# Patient Record
Sex: FEMALE | Race: WHITE | NOT HISPANIC OR LATINO | Employment: FULL TIME | URBAN - METROPOLITAN AREA
[De-identification: names, ages, dates, MRNs, and addresses within clinical notes are randomized per-mention and may not be internally consistent; named-entity substitution may affect disease eponyms.]

---

## 2017-01-12 ENCOUNTER — HOSPITAL ENCOUNTER (OUTPATIENT)
Dept: LAB | Facility: MEDICAL CENTER | Age: 37
End: 2017-01-12
Attending: PHYSICIAN ASSISTANT
Payer: COMMERCIAL

## 2017-01-12 LAB
25(OH)D3 SERPL-MCNC: 12 NG/ML (ref 30–100)
ALBUMIN SERPL BCP-MCNC: 4.3 G/DL (ref 3.2–4.9)
ALBUMIN/GLOB SERPL: 1.5 G/DL
ALP SERPL-CCNC: 45 U/L (ref 30–99)
ALT SERPL-CCNC: 18 U/L (ref 2–50)
ANION GAP SERPL CALC-SCNC: 7 MMOL/L (ref 0–11.9)
AST SERPL-CCNC: 18 U/L (ref 12–45)
BILIRUB SERPL-MCNC: 0.9 MG/DL (ref 0.1–1.5)
BUN SERPL-MCNC: 9 MG/DL (ref 8–22)
CALCIUM SERPL-MCNC: 9.4 MG/DL (ref 8.5–10.5)
CHLORIDE SERPL-SCNC: 107 MMOL/L (ref 96–112)
CHOLEST SERPL-MCNC: 157 MG/DL (ref 100–199)
CO2 SERPL-SCNC: 23 MMOL/L (ref 20–33)
CREAT SERPL-MCNC: 0.79 MG/DL (ref 0.5–1.4)
FSH SERPL-ACNC: 2.2 MIU/ML
GLOBULIN SER CALC-MCNC: 2.8 G/DL (ref 1.9–3.5)
GLUCOSE SERPL-MCNC: 82 MG/DL (ref 65–99)
HDLC SERPL-MCNC: 61 MG/DL
LDLC SERPL CALC-MCNC: 79 MG/DL
LH SERPL-ACNC: 1 IU/L
POTASSIUM SERPL-SCNC: 4.2 MMOL/L (ref 3.6–5.5)
PROT SERPL-MCNC: 7.1 G/DL (ref 6–8.2)
SODIUM SERPL-SCNC: 137 MMOL/L (ref 135–145)
TRIGL SERPL-MCNC: 83 MG/DL (ref 0–149)
TSH SERPL DL<=0.005 MIU/L-ACNC: 1.03 UIU/ML (ref 0.3–3.7)

## 2017-01-12 PROCEDURE — 80061 LIPID PANEL: CPT

## 2017-01-12 PROCEDURE — 82306 VITAMIN D 25 HYDROXY: CPT

## 2017-01-12 PROCEDURE — 36415 COLL VENOUS BLD VENIPUNCTURE: CPT

## 2017-01-12 PROCEDURE — 80053 COMPREHEN METABOLIC PANEL: CPT

## 2017-01-12 PROCEDURE — 83002 ASSAY OF GONADOTROPIN (LH): CPT

## 2017-01-12 PROCEDURE — 84443 ASSAY THYROID STIM HORMONE: CPT

## 2017-01-12 PROCEDURE — 83001 ASSAY OF GONADOTROPIN (FSH): CPT

## 2017-06-30 ENCOUNTER — OFFICE VISIT (OUTPATIENT)
Dept: URGENT CARE | Facility: PHYSICIAN GROUP | Age: 37
End: 2017-06-30
Payer: COMMERCIAL

## 2017-06-30 VITALS
TEMPERATURE: 98.8 F | RESPIRATION RATE: 16 BRPM | BODY MASS INDEX: 28.03 KG/M2 | OXYGEN SATURATION: 96 % | DIASTOLIC BLOOD PRESSURE: 70 MMHG | HEART RATE: 90 BPM | SYSTOLIC BLOOD PRESSURE: 108 MMHG | WEIGHT: 179 LBS

## 2017-06-30 DIAGNOSIS — J01.90 ACUTE BACTERIAL SINUSITIS: ICD-10-CM

## 2017-06-30 DIAGNOSIS — B96.89 ACUTE BACTERIAL SINUSITIS: ICD-10-CM

## 2017-06-30 PROCEDURE — 99214 OFFICE O/P EST MOD 30 MIN: CPT | Performed by: FAMILY MEDICINE

## 2017-06-30 RX ORDER — PREDNISONE 20 MG/1
TABLET ORAL
Qty: 5 TAB | Refills: 0 | Status: SHIPPED | OUTPATIENT
Start: 2017-06-30

## 2017-06-30 RX ORDER — AZITHROMYCIN 250 MG/1
TABLET, FILM COATED ORAL
Qty: 6 TAB | Refills: 0 | Status: SHIPPED | OUTPATIENT
Start: 2017-06-30

## 2017-06-30 NOTE — MR AVS SNAPSHOT
Larisa Castro Major   2017 4:45 PM   Office Visit   MRN: [id number]    Department:  [facility]   Dept Phone:  [phone]    Description:  Female : 1980   Provider:  Chetan Arechiga M.D.           Reason for Visit     Otalgia L ear pain bilat      Allergies as of 2017     Allergen Noted Reactions    Cipro Xr 2011   Rash    Ear drops    Keflex 2013   Rash    Other Misc 2012       nuts      You were diagnosed with     Acute bacterial sinusitis   [[id number]]         Vital Signs     Blood Pressure Pulse Temperature Respirations Weight Oxygen Saturation    108/70 mmHg 90 37.1 °C (98.8 °F) 16 81.194 kg (179 lb) 96%    Last Menstrual Period Smoking Status                2011 Never Smoker           Basic Information     Date Of Birth Sex Race Ethnicity Preferred Language    1980 Female White Non- English      Health Maintenance        Date Due Completion Dates    IMM DTaP/Tdap/Td Vaccine (1 - Tdap) 1999 ---    PAP SMEAR 2001 ---            Current Immunizations     No immunizations on file.      Below and/or attached are the medications your provider expects you to take. Review all of your home medications and newly ordered medications with your provider and/or pharmacist. Follow medication instructions as directed by your provider and/or pharmacist. Please keep your medication list with you and share with your provider. Update the information when medications are discontinued, doses are changed, or new medications (including over-the-counter products) are added; and carry medication information at all times in the event of emergency situations     Allergies:  CIPRO XR - Rash     KEFLEX - Rash     OTHER MISC - (reactions not documented)               Medications  Valid as of: 2017 -  5:09 PM    Generic Name Brand Name Tablet Size Instructions for use    Azithromycin (Tab) ZITHROMAX 250 MG 2 TABS ON DAY 1, 1 TAB ON DAYS 2-5.        PredniSONE (Tab) DELTASONE 20 MG 1 TAB ONCE A DAY ONLY IF NEEDED FOR STUFFY NOSE, STUFFY HEAD, AND/OR STUFFY EARS. TAKE WITH FOOD.        .                 Medicines prescribed today were sent to:     [facility] [address] [place] [address] [place], [place] - [address] [facility] [address]    [address] [place] [address]    Phone: [phone] Fax: [phone]    Open 24 Hours?: No      Medication refill instructions:       If your prescription bottle indicates you have medication refills left, it is not necessary to call your provider’s office. Please contact your pharmacy and they will refill your medication.    If your prescription bottle indicates you do not have any refills left, you may request refills at any time through one of the following ways: The online [employer] system (except Urgent Care), by calling your provider’s office, or by asking your pharmacy to contact your provider’s office with a refill request. Medication refills are processed only during regular business hours and may not be available until the next business day. Your provider may request additional information or to have a follow-up visit with you prior to refilling your medication.   *Please Note: Medication refills are assigned a new Rx number when refilled electronically. Your pharmacy may indicate that no refills were authorized even though a new prescription for the same medication is available at the pharmacy. Please request the medicine by name with the pharmacy before contacting your provider for a refill.           [employer] Access Code: Activation code not generated  Current [employer] Status: Active

## 2017-07-01 NOTE — PROGRESS NOTES
Chief Complaint:    Chief Complaint   Patient presents with   • Otalgia     L ear pain bilat       History of Present Illness:    This is a new problem. For about 3 weeks, she has had fluctuating nasal symptoms with occasional purulent mucus from nose. Has been having intermittent low grade fever in 99 F range. Has had headaches, fatigue, and body aches. Past few days, ears are bothering her, currently in clinic only the left ear is bothersome. Feels like a lot of pressure in left ear. No sore throat or cough. Z-windy works/tolerates.      Review of Systems:    Constitutional: See HPI.  Eyes: Negative for change in vision, photophobia, pain, redness, and discharge.  ENT: See HPI.   Respiratory: Negative for cough, hemoptysis, sputum production, shortness of breath, wheezing, and stridor.    Cardiovascular: Negative for chest pain, palpitations, orthopnea, claudication, leg swelling, and PND.   Gastrointestinal: Negative for abdominal pain, nausea, vomiting, diarrhea, constipation, blood in stool, and melena.   Genitourinary: Negative for dysuria, urinary urgency, urinary frequency, hematuria, and flank pain.   Musculoskeletal: See HPI.  Skin: Negative for rash and itching.   Neurological: See HPI.  Endo: Negative for polydipsia.   Heme: Does not bruise/bleed easily.   Psychiatric/Behavioral: Negative for depression, suicidal ideas, hallucinations, memory loss and substance abuse. The patient is not nervous/anxious and does not have insomnia.      Past Medical History:    Past Medical History   Diagnosis Date   • Cold    • Other specified symptom associated with female genital organs      endometriosis, polyps, bleeding   • Renal disorder      some renal failure, resolved, freq infections   • PCOS (polycystic ovarian syndrome)        Past Surgical History:    Past Surgical History   Procedure Laterality Date   • Hysterectomy, vaginal  1/6/12   • Cystoscopy  1/6/12   • Vaginal hysterectomy total  1/6/2012      Performed by JERMAINE LOVING at SURGERY SAME DAY [facility]   • Cystoscopy  1/6/2012     Performed by JERMAINE LOVING at SURGERY SAME DAY [facility] OR[facility]       Social History:    Social History     Social History   • Marital Status:      Spouse Name: N/A   • Number of Children: N/A   • Years of Education: N/A     Occupational History   • Not on file.     Social History Main Topics   • Smoking status: Never Smoker    • Smokeless tobacco: Never Used   • Alcohol Use: No   • Drug Use: No   • Sexual Activity: Not on file     Other Topics Concern   • Not on file     Social History Narrative       Family History:    History reviewed. No pertinent family history.    Medications:    No current outpatient prescriptions on file prior to visit.     No current facility-administered medications on file prior to visit.       Allergies:    Allergies   Allergen Reactions   • Cipro Xr Rash     Ear drops   • Keflex Rash   • Other Misc      nuts         Vitals:    Filed Vitals:    06/30/17 1646   BP: 108/70   Pulse: 90   Temp: 37.1 °C (98.8 °F)   Resp: 16   Weight: 81.194 kg (179 lb)   SpO2: 96%       Physical Exam:    Constitutional: Vital signs reviewed. Appears well-developed and well-nourished. No acute distress.   Eyes: Sclera white, conjunctivae clear.   ENT: External ears normal. External auditory canals normal without discharge. TMs translucent and non-bulging. Hearing normal. Nasal mucosa erythematous bilaterally. Lips/teeth are normal. Oral mucosa pink and moist. Posterior pharynx: WNL.  Neck: Neck supple.   Pulmonary/Chest: Respirations non-labored.   Lymph: Cervical nodes without tenderness or enlargement.  Musculoskeletal: Normal gait. Normal range of motion. No muscular atrophy or weakness.  Neurological: Alert and oriented to person, place, and time. Muscle tone normal. Coordination normal.   Skin: No rashes or lesions. Warm, dry, normal turgor.  Psychiatric: Normal mood and affect. Behavior is normal. Judgment  and thought content normal.       Assessment / Plan:    1. Acute bacterial sinusitis  - azithromycin (ZITHROMAX) 250 MG Tab; 2 TABS ON DAY 1, 1 TAB ON DAYS 2-5.  Dispense: 6 Tab; Refill: 0  - predniSONE (DELTASONE) 20 MG Tab; 1 TAB ONCE A DAY ONLY IF NEEDED FOR STUFFY NOSE, STUFFY HEAD, AND/OR STUFFY EARS. TAKE WITH FOOD.  Dispense: 5 Tab; Refill: 0      Discussed with her DDX and management options.    Ears are normal on exam. Possible discomfort due to sinus issues.    Agreeable to medications prescribed.    Follow-up with PCP or urgent care if getting worse or not better with above.

## 2017-08-30 ENCOUNTER — APPOINTMENT (OUTPATIENT)
Dept: LAB | Facility: MEDICAL CENTER | Age: 37
End: 2017-08-30
Attending: NURSE PRACTITIONER
Payer: COMMERCIAL

## 2017-08-30 ENCOUNTER — HOSPITAL ENCOUNTER (OUTPATIENT)
Dept: LAB | Facility: MEDICAL CENTER | Age: 37
End: 2017-08-30
Attending: FAMILY MEDICINE
Payer: COMMERCIAL

## 2017-08-30 ENCOUNTER — HOSPITAL ENCOUNTER (OUTPATIENT)
Facility: MEDICAL CENTER | Age: 37
End: 2017-08-30
Attending: FAMILY MEDICINE
Payer: COMMERCIAL

## 2017-08-30 LAB
ALBUMIN SERPL BCP-MCNC: 4.1 G/DL (ref 3.2–4.9)
ALBUMIN/GLOB SERPL: 1.5 G/DL
ALP SERPL-CCNC: 56 U/L (ref 30–99)
ALT SERPL-CCNC: 16 U/L (ref 2–50)
ANION GAP SERPL CALC-SCNC: 6 MMOL/L (ref 0–11.9)
AST SERPL-CCNC: 19 U/L (ref 12–45)
BASOPHILS # BLD AUTO: 0.4 % (ref 0–1.8)
BASOPHILS # BLD: 0.02 K/UL (ref 0–0.12)
BILIRUB SERPL-MCNC: 0.7 MG/DL (ref 0.1–1.5)
BUN SERPL-MCNC: 10 MG/DL (ref 8–22)
CALCIUM SERPL-MCNC: 9.3 MG/DL (ref 8.5–10.5)
CHLORIDE SERPL-SCNC: 107 MMOL/L (ref 96–112)
CO2 SERPL-SCNC: 23 MMOL/L (ref 20–33)
CREAT SERPL-MCNC: 0.7 MG/DL (ref 0.5–1.4)
EOSINOPHIL # BLD AUTO: 0.07 K/UL (ref 0–0.51)
EOSINOPHIL NFR BLD: 1.4 % (ref 0–6.9)
ERYTHROCYTE [DISTWIDTH] IN BLOOD BY AUTOMATED COUNT: 40.5 FL (ref 35.9–50)
GFR SERPL CREATININE-BSD FRML MDRD: >60 ML/MIN/1.73 M 2
GLOBULIN SER CALC-MCNC: 2.7 G/DL (ref 1.9–3.5)
GLUCOSE SERPL-MCNC: 74 MG/DL (ref 65–99)
HCT VFR BLD AUTO: 39 % (ref 37–47)
HGB BLD-MCNC: 13.4 G/DL (ref 12–16)
IMM GRANULOCYTES # BLD AUTO: 0 K/UL (ref 0–0.11)
IMM GRANULOCYTES NFR BLD AUTO: 0 % (ref 0–0.9)
LYMPHOCYTES # BLD AUTO: 2.2 K/UL (ref 1–4.8)
LYMPHOCYTES NFR BLD: 43.1 % (ref 22–41)
MCH RBC QN AUTO: 32.2 PG (ref 27–33)
MCHC RBC AUTO-ENTMCNC: 34.4 G/DL (ref 33.6–35)
MCV RBC AUTO: 93.8 FL (ref 81.4–97.8)
MONOCYTES # BLD AUTO: 0.46 K/UL (ref 0–0.85)
MONOCYTES NFR BLD AUTO: 9 % (ref 0–13.4)
NEUTROPHILS # BLD AUTO: 2.36 K/UL (ref 2–7.15)
NEUTROPHILS NFR BLD: 46.1 % (ref 44–72)
NRBC # BLD AUTO: 0 K/UL
NRBC BLD AUTO-RTO: 0 /100 WBC
PLATELET # BLD AUTO: 225 K/UL (ref 164–446)
PMV BLD AUTO: 9.8 FL (ref 9–12.9)
POTASSIUM SERPL-SCNC: 4.1 MMOL/L (ref 3.6–5.5)
PROT SERPL-MCNC: 6.8 G/DL (ref 6–8.2)
RBC # BLD AUTO: 4.16 M/UL (ref 4.2–5.4)
SODIUM SERPL-SCNC: 136 MMOL/L (ref 135–145)
WBC # BLD AUTO: 5.1 K/UL (ref 4.8–10.8)

## 2017-08-30 PROCEDURE — 87046 STOOL CULTR AEROBIC BACT EA: CPT

## 2017-08-30 PROCEDURE — 87328 CRYPTOSPORIDIUM AG IA: CPT

## 2017-08-30 PROCEDURE — 85025 COMPLETE CBC W/AUTO DIFF WBC: CPT

## 2017-08-30 PROCEDURE — 87045 FECES CULTURE AEROBIC BACT: CPT

## 2017-08-30 PROCEDURE — 87493 C DIFF AMPLIFIED PROBE: CPT

## 2017-08-30 PROCEDURE — 87329 GIARDIA AG IA: CPT

## 2017-08-30 PROCEDURE — 84443 ASSAY THYROID STIM HORMONE: CPT

## 2017-08-30 PROCEDURE — 82306 VITAMIN D 25 HYDROXY: CPT

## 2017-08-30 PROCEDURE — 87899 AGENT NOS ASSAY W/OPTIC: CPT

## 2017-08-30 PROCEDURE — 80053 COMPREHEN METABOLIC PANEL: CPT

## 2017-08-30 PROCEDURE — 36415 COLL VENOUS BLD VENIPUNCTURE: CPT

## 2017-08-31 LAB
25(OH)D3 SERPL-MCNC: 45 NG/ML (ref 30–100)
C DIFF DNA SPEC QL NAA+PROBE: NEGATIVE
C DIFF TOX GENS STL QL NAA+PROBE: NEGATIVE
G LAMBLIA+C PARVUM AG STL QL RAPID: NORMAL
SIGNIFICANT IND 70042: NORMAL
SITE SITE: NORMAL
SOURCE SOURCE: NORMAL
TSH SERPL DL<=0.005 MIU/L-ACNC: 1.13 UIU/ML (ref 0.3–3.7)

## 2017-09-01 LAB
E COLI SXT1+2 STL IA: NORMAL
SIGNIFICANT IND 70042: NORMAL
SITE SITE: NORMAL
SOURCE SOURCE: NORMAL

## 2017-09-03 LAB
BACTERIA STL CULT: NORMAL
E COLI SXT1+2 STL IA: NORMAL
SIGNIFICANT IND 70042: NORMAL
SITE SITE: NORMAL
SOURCE SOURCE: NORMAL

## 2018-05-19 ENCOUNTER — OFFICE VISIT (OUTPATIENT)
Dept: URGENT CARE | Facility: PHYSICIAN GROUP | Age: 38
End: 2018-05-19
Payer: COMMERCIAL

## 2018-05-19 VITALS
SYSTOLIC BLOOD PRESSURE: 100 MMHG | RESPIRATION RATE: 20 BRPM | HEART RATE: 104 BPM | HEIGHT: 67 IN | TEMPERATURE: 99 F | BODY MASS INDEX: 28.25 KG/M2 | DIASTOLIC BLOOD PRESSURE: 70 MMHG | WEIGHT: 180 LBS | OXYGEN SATURATION: 97 %

## 2018-05-19 DIAGNOSIS — J02.9 SORE THROAT: ICD-10-CM

## 2018-05-19 LAB
INT CON NEG: NORMAL
INT CON POS: NORMAL
S PYO AG THROAT QL: NORMAL

## 2018-05-19 PROCEDURE — 87880 STREP A ASSAY W/OPTIC: CPT | Performed by: EMERGENCY MEDICINE

## 2018-05-19 PROCEDURE — 99213 OFFICE O/P EST LOW 20 MIN: CPT | Performed by: EMERGENCY MEDICINE

## 2018-05-19 ASSESSMENT — ENCOUNTER SYMPTOMS
NERVOUS/ANXIOUS: 0
COUGH: 0
SENSORY CHANGE: 0
VOMITING: 0
HEMOPTYSIS: 0
NAUSEA: 0
NECK PAIN: 0
FEVER: 0
BACK PAIN: 0
SORE THROAT: 1
PALPITATIONS: 0
EYE REDNESS: 0
ABDOMINAL PAIN: 0
STRIDOR: 0
SPEECH CHANGE: 0
CHILLS: 0
EYE DISCHARGE: 0

## 2018-05-19 NOTE — PROGRESS NOTES
Subjective:      Larisa Gonsalves is a 37 y.o. female who presents with Pharyngitis (4 days )            HPI  Pt with URI symptoms for the past 4 days with sore throat and pain in her ears No fever (mild) no NVD. Tried Dayquil / Nyquil and Claritin D with rapid heart rate.     Allergies   Allergen Reactions   • Cipro Xr Rash     Ear drops   • Keflex Rash   • Other Misc      nuts     Social History     Social History   • Marital status:      Spouse name: N/A   • Number of children: N/A   • Years of education: N/A     Occupational History   • Not on file.     Social History Main Topics   • Smoking status: Never Smoker   • Smokeless tobacco: Never Used   • Alcohol use No   • Drug use: No   • Sexual activity: Not on file     Other Topics Concern   • Not on file     Social History Narrative   • No narrative on file     Past Medical History:   Diagnosis Date   • Cold    • Other specified symptom associated with female genital organs     endometriosis, polyps, bleeding   • PCOS (polycystic ovarian syndrome)    • Renal disorder     some renal failure, resolved, freq infections     Current Outpatient Prescriptions on File Prior to Visit   Medication Sig Dispense Refill   • azithromycin (ZITHROMAX) 250 MG Tab 2 TABS ON DAY 1, 1 TAB ON DAYS 2-5. 6 Tab 0   • predniSONE (DELTASONE) 20 MG Tab 1 TAB ONCE A DAY ONLY IF NEEDED FOR STUFFY NOSE, STUFFY HEAD, AND/OR STUFFY EARS. TAKE WITH FOOD. 5 Tab 0     No current facility-administered medications on file prior to visit.    No family history on file.  Review of Systems   Constitutional: Negative for chills and fever.   HENT: Positive for congestion and sore throat. Negative for ear discharge and nosebleeds.    Eyes: Negative for discharge and redness.   Respiratory: Negative for cough, hemoptysis and stridor.    Cardiovascular: Negative for chest pain and palpitations.   Gastrointestinal: Negative for abdominal pain, nausea and vomiting.   Genitourinary: Negative.   "  Musculoskeletal: Negative for back pain and neck pain.   Skin: Negative for rash.   Neurological: Negative for sensory change and speech change.   Psychiatric/Behavioral: The patient is not nervous/anxious.           Objective:     /70   Pulse (!) 104   Temp 37.2 °C (99 °F)   Resp 20   Ht 1.702 m (5' 7.01\")   Wt 81.6 kg (180 lb)   LMP 12/18/2011   SpO2 97%   Breastfeeding? No   BMI 28.19 kg/m²      Physical Exam   Constitutional: She appears well-developed and well-nourished. No distress.   HENT:   Head: Normocephalic and atraumatic.   Right Ear: External ear normal.   Left Ear: External ear normal.   Mouth/Throat: No oropharyngeal exudate.   Eyes: Conjunctivae are normal. Right eye exhibits no discharge. Left eye exhibits no discharge.   Neck: Normal range of motion.   Cardiovascular: Normal rate and regular rhythm.    Pulmonary/Chest: Effort normal and breath sounds normal. No stridor. No respiratory distress.   Abdominal: She exhibits no distension. There is no tenderness.   Musculoskeletal: Normal range of motion.   Lymphadenopathy:     She has no cervical adenopathy.   Neurological: She is alert. Coordination normal.   Skin: Skin is warm. No rash noted. She is not diaphoretic.   Psychiatric: She has a normal mood and affect. Her behavior is normal.   Nursing note and vitals reviewed.            Strep neg  Assessment/Plan:     1. Sore throat    I am recommending the patient initiate/ continue hydration efforts including the use of a vaporizer/humidifier/ netti pot. I also recommend the pt, initiate Mucinex (if older than 4) Sudafed or Dayquil if not hypertensive. Try OTC remedies for sore throat,  declined xylocaine.. If the patient's condition exacerbates with worsening dysphagia, shortness of breath, uncontrolled fever, headache or chest pressure he/she will return immediately to the urgent care or go to  the emergency department for further evaluation.    Jorge L Friedman    - POCT Rapid Strep " A

## 2018-12-12 ENCOUNTER — HOSPITAL ENCOUNTER (OUTPATIENT)
Dept: LAB | Facility: MEDICAL CENTER | Age: 38
End: 2018-12-12
Attending: FAMILY MEDICINE
Payer: COMMERCIAL

## 2018-12-12 LAB
25(OH)D3 SERPL-MCNC: 27 NG/ML (ref 30–100)
ALBUMIN SERPL BCP-MCNC: 4.2 G/DL (ref 3.2–4.9)
ALBUMIN/GLOB SERPL: 1.6 G/DL
ALP SERPL-CCNC: 59 U/L (ref 30–99)
ALT SERPL-CCNC: 41 U/L (ref 2–50)
ANION GAP SERPL CALC-SCNC: 4 MMOL/L (ref 0–11.9)
AST SERPL-CCNC: 35 U/L (ref 12–45)
BASOPHILS # BLD AUTO: 0.6 % (ref 0–1.8)
BASOPHILS # BLD: 0.03 K/UL (ref 0–0.12)
BILIRUB SERPL-MCNC: 0.7 MG/DL (ref 0.1–1.5)
BUN SERPL-MCNC: 10 MG/DL (ref 8–22)
CALCIUM SERPL-MCNC: 10.3 MG/DL (ref 8.5–10.5)
CHLORIDE SERPL-SCNC: 106 MMOL/L (ref 96–112)
CHOLEST SERPL-MCNC: 187 MG/DL (ref 100–199)
CO2 SERPL-SCNC: 26 MMOL/L (ref 20–33)
CREAT SERPL-MCNC: 0.85 MG/DL (ref 0.5–1.4)
EOSINOPHIL # BLD AUTO: 0.07 K/UL (ref 0–0.51)
EOSINOPHIL NFR BLD: 1.4 % (ref 0–6.9)
ERYTHROCYTE [DISTWIDTH] IN BLOOD BY AUTOMATED COUNT: 41.5 FL (ref 35.9–50)
FASTING STATUS PATIENT QL REPORTED: NORMAL
GLOBULIN SER CALC-MCNC: 2.7 G/DL (ref 1.9–3.5)
GLUCOSE SERPL-MCNC: 84 MG/DL (ref 65–99)
HCT VFR BLD AUTO: 41.3 % (ref 37–47)
HDLC SERPL-MCNC: 72 MG/DL
HGB BLD-MCNC: 14.1 G/DL (ref 12–16)
IMM GRANULOCYTES # BLD AUTO: 0.02 K/UL (ref 0–0.11)
IMM GRANULOCYTES NFR BLD AUTO: 0.4 % (ref 0–0.9)
LDLC SERPL CALC-MCNC: 99 MG/DL
LYMPHOCYTES # BLD AUTO: 1.83 K/UL (ref 1–4.8)
LYMPHOCYTES NFR BLD: 37 % (ref 22–41)
MCH RBC QN AUTO: 32.8 PG (ref 27–33)
MCHC RBC AUTO-ENTMCNC: 34.1 G/DL (ref 33.6–35)
MCV RBC AUTO: 96 FL (ref 81.4–97.8)
MONOCYTES # BLD AUTO: 0.39 K/UL (ref 0–0.85)
MONOCYTES NFR BLD AUTO: 7.9 % (ref 0–13.4)
NEUTROPHILS # BLD AUTO: 2.6 K/UL (ref 2–7.15)
NEUTROPHILS NFR BLD: 52.7 % (ref 44–72)
NRBC # BLD AUTO: 0.02 K/UL
NRBC BLD-RTO: 0.4 /100 WBC
PLATELET # BLD AUTO: 196 K/UL (ref 164–446)
PMV BLD AUTO: 9.7 FL (ref 9–12.9)
POTASSIUM SERPL-SCNC: 4.5 MMOL/L (ref 3.6–5.5)
PROT SERPL-MCNC: 6.9 G/DL (ref 6–8.2)
RBC # BLD AUTO: 4.3 M/UL (ref 4.2–5.4)
SODIUM SERPL-SCNC: 136 MMOL/L (ref 135–145)
T3FREE SERPL-MCNC: 3.09 PG/ML (ref 2.4–4.2)
T4 FREE SERPL-MCNC: 0.85 NG/DL (ref 0.53–1.43)
THYROPEROXIDASE AB SERPL-ACNC: 0.5 IU/ML (ref 0–9)
TRIGL SERPL-MCNC: 78 MG/DL (ref 0–149)
TSH SERPL DL<=0.005 MIU/L-ACNC: 1.26 UIU/ML (ref 0.38–5.33)
WBC # BLD AUTO: 4.9 K/UL (ref 4.8–10.8)

## 2018-12-12 PROCEDURE — 86376 MICROSOMAL ANTIBODY EACH: CPT

## 2018-12-12 PROCEDURE — 84481 FREE ASSAY (FT-3): CPT

## 2018-12-12 PROCEDURE — 36415 COLL VENOUS BLD VENIPUNCTURE: CPT

## 2018-12-12 PROCEDURE — 84443 ASSAY THYROID STIM HORMONE: CPT

## 2018-12-12 PROCEDURE — 85025 COMPLETE CBC W/AUTO DIFF WBC: CPT

## 2018-12-12 PROCEDURE — 80053 COMPREHEN METABOLIC PANEL: CPT

## 2018-12-12 PROCEDURE — 82306 VITAMIN D 25 HYDROXY: CPT

## 2018-12-12 PROCEDURE — 84439 ASSAY OF FREE THYROXINE: CPT

## 2018-12-12 PROCEDURE — 80061 LIPID PANEL: CPT

## 2019-08-29 ENCOUNTER — APPOINTMENT (RX ONLY)
Dept: URBAN - METROPOLITAN AREA CLINIC 22 | Facility: CLINIC | Age: 39
Setting detail: DERMATOLOGY
End: 2019-08-29

## 2019-08-29 DIAGNOSIS — L82.1 OTHER SEBORRHEIC KERATOSIS: ICD-10-CM

## 2019-08-29 DIAGNOSIS — Q826 OTHER SPECIFIED ANOMALIES OF SKIN: ICD-10-CM

## 2019-08-29 DIAGNOSIS — D22 MELANOCYTIC NEVI: ICD-10-CM

## 2019-08-29 DIAGNOSIS — Z71.89 OTHER SPECIFIED COUNSELING: ICD-10-CM

## 2019-08-29 DIAGNOSIS — Q819 OTHER SPECIFIED ANOMALIES OF SKIN: ICD-10-CM

## 2019-08-29 DIAGNOSIS — L71.0 PERIORAL DERMATITIS: ICD-10-CM

## 2019-08-29 DIAGNOSIS — Q828 OTHER SPECIFIED ANOMALIES OF SKIN: ICD-10-CM

## 2019-08-29 DIAGNOSIS — L81.4 OTHER MELANIN HYPERPIGMENTATION: ICD-10-CM

## 2019-08-29 DIAGNOSIS — D18.0 HEMANGIOMA: ICD-10-CM

## 2019-08-29 PROBLEM — D22.5 MELANOCYTIC NEVI OF TRUNK: Status: ACTIVE | Noted: 2019-08-29

## 2019-08-29 PROBLEM — D22.39 MELANOCYTIC NEVI OF OTHER PARTS OF FACE: Status: ACTIVE | Noted: 2019-08-29

## 2019-08-29 PROBLEM — Q82.8 OTHER SPECIFIED CONGENITAL MALFORMATIONS OF SKIN: Status: ACTIVE | Noted: 2019-08-29

## 2019-08-29 PROBLEM — F32.9 MAJOR DEPRESSIVE DISORDER, SINGLE EPISODE, UNSPECIFIED: Status: ACTIVE | Noted: 2019-08-29

## 2019-08-29 PROBLEM — F41.9 ANXIETY DISORDER, UNSPECIFIED: Status: ACTIVE | Noted: 2019-08-29

## 2019-08-29 PROBLEM — D18.01 HEMANGIOMA OF SKIN AND SUBCUTANEOUS TISSUE: Status: ACTIVE | Noted: 2019-08-29

## 2019-08-29 PROCEDURE — ? COUNSELING

## 2019-08-29 PROCEDURE — 99203 OFFICE O/P NEW LOW 30 MIN: CPT

## 2019-08-29 PROCEDURE — ? PRESCRIPTION

## 2019-08-29 RX ORDER — DOXYCYCLINE HYCLATE 100 MG/1
CAPSULE, GELATIN COATED ORAL BID
Qty: 60 | Refills: 1 | Status: ERX | COMMUNITY
Start: 2019-08-29

## 2019-08-29 RX ADMIN — DOXYCYCLINE HYCLATE: 100 CAPSULE, GELATIN COATED ORAL at 22:19

## 2019-08-29 ASSESSMENT — LOCATION SIMPLE DESCRIPTION DERM
LOCATION SIMPLE: ABDOMEN
LOCATION SIMPLE: RIGHT POSTERIOR UPPER ARM
LOCATION SIMPLE: LEFT UPPER BACK
LOCATION SIMPLE: LEFT CHEEK
LOCATION SIMPLE: RIGHT LOWER BACK
LOCATION SIMPLE: LEFT LIP
LOCATION SIMPLE: LEFT POSTERIOR UPPER ARM

## 2019-08-29 ASSESSMENT — LOCATION DETAILED DESCRIPTION DERM
LOCATION DETAILED: LEFT UPPER CUTANEOUS LIP
LOCATION DETAILED: RIGHT PROXIMAL POSTERIOR UPPER ARM
LOCATION DETAILED: EPIGASTRIC SKIN
LOCATION DETAILED: LEFT DISTAL POSTERIOR UPPER ARM
LOCATION DETAILED: RIGHT SUPERIOR MEDIAL MIDBACK
LOCATION DETAILED: LEFT SUPERIOR CENTRAL MALAR CHEEK
LOCATION DETAILED: LEFT SUPERIOR MEDIAL UPPER BACK

## 2019-08-29 ASSESSMENT — LOCATION ZONE DERM
LOCATION ZONE: ARM
LOCATION ZONE: LIP
LOCATION ZONE: TRUNK
LOCATION ZONE: FACE

## 2020-10-14 ENCOUNTER — HOSPITAL ENCOUNTER (OUTPATIENT)
Dept: LAB | Facility: MEDICAL CENTER | Age: 40
End: 2020-10-14
Attending: FAMILY MEDICINE
Payer: COMMERCIAL

## 2020-10-14 LAB
COVID ORDER STATUS COVID19: NORMAL
SARS-COV-2 RNA RESP QL NAA+PROBE: NOTDETECTED
SPECIMEN SOURCE: NORMAL

## 2020-10-14 PROCEDURE — C9803 HOPD COVID-19 SPEC COLLECT: HCPCS

## 2020-10-14 PROCEDURE — U0003 INFECTIOUS AGENT DETECTION BY NUCLEIC ACID (DNA OR RNA); SEVERE ACUTE RESPIRATORY SYNDROME CORONAVIRUS 2 (SARS-COV-2) (CORONAVIRUS DISEASE [COVID-19]), AMPLIFIED PROBE TECHNIQUE, MAKING USE OF HIGH THROUGHPUT TECHNOLOGIES AS DESCRIBED BY CMS-2020-01-R: HCPCS

## 2020-12-01 ENCOUNTER — HOSPITAL ENCOUNTER (OUTPATIENT)
Dept: LAB | Facility: MEDICAL CENTER | Age: 40
End: 2020-12-01
Attending: FAMILY MEDICINE
Payer: COMMERCIAL

## 2020-12-01 LAB
25(OH)D3 SERPL-MCNC: 34 NG/ML (ref 30–100)
ALBUMIN SERPL BCP-MCNC: 4.3 G/DL (ref 3.2–4.9)
ALBUMIN/GLOB SERPL: 1.5 G/DL
ALP SERPL-CCNC: 93 U/L (ref 30–99)
ALT SERPL-CCNC: 101 U/L (ref 2–50)
ANION GAP SERPL CALC-SCNC: 9 MMOL/L (ref 7–16)
AST SERPL-CCNC: 76 U/L (ref 12–45)
BASOPHILS # BLD AUTO: 0.6 % (ref 0–1.8)
BASOPHILS # BLD: 0.04 K/UL (ref 0–0.12)
BILIRUB SERPL-MCNC: 0.5 MG/DL (ref 0.1–1.5)
BUN SERPL-MCNC: 9 MG/DL (ref 8–22)
CALCIUM SERPL-MCNC: 9.9 MG/DL (ref 8.5–10.5)
CHLORIDE SERPL-SCNC: 101 MMOL/L (ref 96–112)
CHOLEST SERPL-MCNC: 215 MG/DL (ref 100–199)
CO2 SERPL-SCNC: 26 MMOL/L (ref 20–33)
CREAT SERPL-MCNC: 0.82 MG/DL (ref 0.5–1.4)
EOSINOPHIL # BLD AUTO: 0.09 K/UL (ref 0–0.51)
EOSINOPHIL NFR BLD: 1.4 % (ref 0–6.9)
ERYTHROCYTE [DISTWIDTH] IN BLOOD BY AUTOMATED COUNT: 44.4 FL (ref 35.9–50)
FASTING STATUS PATIENT QL REPORTED: NORMAL
GLOBULIN SER CALC-MCNC: 2.9 G/DL (ref 1.9–3.5)
GLUCOSE SERPL-MCNC: 89 MG/DL (ref 65–99)
HCT VFR BLD AUTO: 44.3 % (ref 37–47)
HDLC SERPL-MCNC: 87 MG/DL
HGB BLD-MCNC: 14.8 G/DL (ref 12–16)
IMM GRANULOCYTES # BLD AUTO: 0.01 K/UL (ref 0–0.11)
IMM GRANULOCYTES NFR BLD AUTO: 0.2 % (ref 0–0.9)
LDLC SERPL CALC-MCNC: 115 MG/DL
LYMPHOCYTES # BLD AUTO: 2.97 K/UL (ref 1–4.8)
LYMPHOCYTES NFR BLD: 47.8 % (ref 22–41)
MCH RBC QN AUTO: 33.7 PG (ref 27–33)
MCHC RBC AUTO-ENTMCNC: 33.4 G/DL (ref 33.6–35)
MCV RBC AUTO: 100.9 FL (ref 81.4–97.8)
MONOCYTES # BLD AUTO: 0.43 K/UL (ref 0–0.85)
MONOCYTES NFR BLD AUTO: 6.9 % (ref 0–13.4)
NEUTROPHILS # BLD AUTO: 2.67 K/UL (ref 2–7.15)
NEUTROPHILS NFR BLD: 43.1 % (ref 44–72)
NRBC # BLD AUTO: 0 K/UL
NRBC BLD-RTO: 0 /100 WBC
PLATELET # BLD AUTO: 225 K/UL (ref 164–446)
PMV BLD AUTO: 9.8 FL (ref 9–12.9)
POTASSIUM SERPL-SCNC: 4.2 MMOL/L (ref 3.6–5.5)
PROT SERPL-MCNC: 7.2 G/DL (ref 6–8.2)
RBC # BLD AUTO: 4.39 M/UL (ref 4.2–5.4)
SODIUM SERPL-SCNC: 136 MMOL/L (ref 135–145)
T4 FREE SERPL-MCNC: 1.59 NG/DL (ref 0.93–1.7)
TRIGL SERPL-MCNC: 65 MG/DL (ref 0–149)
TSH SERPL DL<=0.005 MIU/L-ACNC: 3.65 UIU/ML (ref 0.38–5.33)
WBC # BLD AUTO: 6.2 K/UL (ref 4.8–10.8)

## 2020-12-01 PROCEDURE — 36415 COLL VENOUS BLD VENIPUNCTURE: CPT

## 2020-12-01 PROCEDURE — 84443 ASSAY THYROID STIM HORMONE: CPT

## 2020-12-01 PROCEDURE — 82306 VITAMIN D 25 HYDROXY: CPT

## 2020-12-01 PROCEDURE — 80061 LIPID PANEL: CPT

## 2020-12-01 PROCEDURE — 80053 COMPREHEN METABOLIC PANEL: CPT

## 2020-12-01 PROCEDURE — 85025 COMPLETE CBC W/AUTO DIFF WBC: CPT

## 2020-12-01 PROCEDURE — 84439 ASSAY OF FREE THYROXINE: CPT

## 2021-01-14 ENCOUNTER — OFFICE VISIT (OUTPATIENT)
Dept: URGENT CARE | Facility: PHYSICIAN GROUP | Age: 41
End: 2021-01-14
Payer: COMMERCIAL

## 2021-01-14 VITALS
OXYGEN SATURATION: 97 % | RESPIRATION RATE: 16 BRPM | SYSTOLIC BLOOD PRESSURE: 120 MMHG | HEIGHT: 67 IN | DIASTOLIC BLOOD PRESSURE: 68 MMHG | TEMPERATURE: 98.6 F | WEIGHT: 170 LBS | BODY MASS INDEX: 26.68 KG/M2 | HEART RATE: 83 BPM

## 2021-01-14 DIAGNOSIS — S06.0X0A CONCUSSION WITHOUT LOSS OF CONSCIOUSNESS, INITIAL ENCOUNTER: ICD-10-CM
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
## 2021-01-14 PROCEDURE — 99214 OFFICE O/P EST MOD 30 MIN: CPT | Performed by: STUDENT IN AN ORGANIZED HEALTH CARE EDUCATION/TRAINING PROGRAM

## 2021-01-14 RX ORDER — LISDEXAMFETAMINE DIMESYLATE 40 MG/1
CAPSULE ORAL EVERY MORNING
COMMUNITY
Start: 2020-11-02

## 2021-01-14 RX ORDER — ZOLPIDEM TARTRATE 6.25 MG/1
TABLET, FILM COATED, EXTENDED RELEASE ORAL
COMMUNITY
Start: 2020-11-10

## 2021-01-14 ASSESSMENT — FIBROSIS 4 INDEX: FIB4 SCORE: 1.34
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
## 2021-04-08 ENCOUNTER — HOSPITAL ENCOUNTER (OUTPATIENT)
Dept: RADIOLOGY | Facility: MEDICAL CENTER | Age: 41
End: 2021-04-08
Attending: CHIROPRACTOR
Payer: COMMERCIAL

## 2021-04-08 DIAGNOSIS — S06.0X0S CONCUSSION WITHOUT LOSS OF CONSCIOUSNESS, SEQUELA (HCC): ICD-10-CM

## 2021-04-08 DIAGNOSIS — M50.121 DISORDER OF INTERVERTEBRAL DISC AT C4-C5 LEVEL WITH RADICULOPATHY: ICD-10-CM

## 2021-04-08 DIAGNOSIS — G44.319 ACUTE POST-TRAUMATIC HEADACHE, NOT INTRACTABLE: ICD-10-CM

## 2021-04-08 PROCEDURE — 70551 MRI BRAIN STEM W/O DYE: CPT

## 2021-04-08 PROCEDURE — 72141 MRI NECK SPINE W/O DYE: CPT

## 2021-05-17 ENCOUNTER — HOSPITAL ENCOUNTER (OUTPATIENT)
Dept: RADIOLOGY | Facility: MEDICAL CENTER | Age: 41
End: 2021-05-17
Attending: CHIROPRACTOR
Payer: COMMERCIAL

## 2021-05-17 ENCOUNTER — HOSPITAL ENCOUNTER (OUTPATIENT)
Dept: LAB | Facility: MEDICAL CENTER | Age: 41
End: 2021-05-17
Attending: FAMILY MEDICINE
Payer: COMMERCIAL

## 2021-05-17 DIAGNOSIS — M51.16 RADICULOPATHY DUE TO LUMBAR INTERVERTEBRAL DISC DISORDER: ICD-10-CM

## 2021-05-17 LAB
ALBUMIN SERPL BCP-MCNC: 4.4 G/DL (ref 3.2–4.9)
ALBUMIN/GLOB SERPL: 1.6 G/DL
ALP SERPL-CCNC: 60 U/L (ref 30–99)
ALT SERPL-CCNC: 15 U/L (ref 2–50)
ANION GAP SERPL CALC-SCNC: 11 MMOL/L (ref 7–16)
AST SERPL-CCNC: 10 U/L (ref 12–45)
BILIRUB SERPL-MCNC: 1 MG/DL (ref 0.1–1.5)
BUN SERPL-MCNC: 14 MG/DL (ref 8–22)
CALCIUM SERPL-MCNC: 9.5 MG/DL (ref 8.5–10.5)
CHLORIDE SERPL-SCNC: 104 MMOL/L (ref 96–112)
CO2 SERPL-SCNC: 22 MMOL/L (ref 20–33)
CREAT SERPL-MCNC: 0.87 MG/DL (ref 0.5–1.4)
GGT SERPL-CCNC: 61 U/L (ref 7–34)
GLOBULIN SER CALC-MCNC: 2.7 G/DL (ref 1.9–3.5)
GLUCOSE SERPL-MCNC: 83 MG/DL (ref 65–99)
HCV AB SER QL: NORMAL
POTASSIUM SERPL-SCNC: 4.2 MMOL/L (ref 3.6–5.5)
PROT SERPL-MCNC: 7.1 G/DL (ref 6–8.2)
SODIUM SERPL-SCNC: 137 MMOL/L (ref 135–145)

## 2021-05-17 PROCEDURE — 86038 ANTINUCLEAR ANTIBODIES: CPT

## 2021-05-17 PROCEDURE — 72148 MRI LUMBAR SPINE W/O DYE: CPT

## 2021-05-17 PROCEDURE — 80053 COMPREHEN METABOLIC PANEL: CPT

## 2021-05-17 PROCEDURE — 86803 HEPATITIS C AB TEST: CPT

## 2021-05-17 PROCEDURE — 36415 COLL VENOUS BLD VENIPUNCTURE: CPT

## 2021-05-17 PROCEDURE — 82977 ASSAY OF GGT: CPT

## 2021-05-19 LAB — NUCLEAR IGG SER QL IA: NORMAL

## 2021-07-23 ENCOUNTER — APPOINTMENT (OUTPATIENT)
Dept: RADIOLOGY | Facility: MEDICAL CENTER | Age: 41
End: 2021-07-23
Attending: CHIROPRACTOR
Payer: COMMERCIAL

## 2021-07-23 DIAGNOSIS — M54.6 PAIN IN THORACIC SPINE: ICD-10-CM

## 2021-07-23 PROCEDURE — 72146 MRI CHEST SPINE W/O DYE: CPT

## 2022-06-01 ENCOUNTER — HOSPITAL ENCOUNTER (OUTPATIENT)
Dept: RADIOLOGY | Facility: MEDICAL CENTER | Age: 42
End: 2022-06-01
Attending: STUDENT IN AN ORGANIZED HEALTH CARE EDUCATION/TRAINING PROGRAM
Payer: OTHER MISCELLANEOUS

## 2022-06-01 DIAGNOSIS — M54.9 DORSALGIA: ICD-10-CM

## 2022-06-01 DIAGNOSIS — M54.50 LOW BACK PAIN, UNSPECIFIED BACK PAIN LATERALITY, UNSPECIFIED CHRONICITY, UNSPECIFIED WHETHER SCIATICA PRESENT: ICD-10-CM

## 2022-06-01 PROCEDURE — 72072 X-RAY EXAM THORAC SPINE 3VWS: CPT

## 2022-06-01 PROCEDURE — 72110 X-RAY EXAM L-2 SPINE 4/>VWS: CPT

## 2024-05-22 ENCOUNTER — HOSPITAL ENCOUNTER (OUTPATIENT)
Dept: LAB | Facility: MEDICAL CENTER | Age: 44
End: 2024-05-22
Attending: NURSE PRACTITIONER
Payer: COMMERCIAL

## 2024-05-22 LAB
25(OH)D3 SERPL-MCNC: 75 NG/ML (ref 30–100)
ALBUMIN SERPL BCP-MCNC: 4.1 G/DL (ref 3.2–4.9)
ALBUMIN/GLOB SERPL: 1.8 G/DL
ALP SERPL-CCNC: 63 U/L (ref 30–99)
ALT SERPL-CCNC: 25 U/L (ref 2–50)
ANION GAP SERPL CALC-SCNC: 10 MMOL/L (ref 7–16)
AST SERPL-CCNC: 20 U/L (ref 12–45)
BILIRUB SERPL-MCNC: 0.9 MG/DL (ref 0.1–1.5)
BUN SERPL-MCNC: 7 MG/DL (ref 8–22)
CALCIUM ALBUM COR SERPL-MCNC: 9 MG/DL (ref 8.5–10.5)
CALCIUM SERPL-MCNC: 9.1 MG/DL (ref 8.5–10.5)
CHLORIDE SERPL-SCNC: 107 MMOL/L (ref 96–112)
CHOLEST SERPL-MCNC: 145 MG/DL (ref 100–199)
CO2 SERPL-SCNC: 22 MMOL/L (ref 20–33)
CREAT SERPL-MCNC: 0.7 MG/DL (ref 0.5–1.4)
CRP SERPL HS-MCNC: <0.3 MG/DL (ref 0–0.75)
ERYTHROCYTE [DISTWIDTH] IN BLOOD BY AUTOMATED COUNT: 42 FL (ref 35.9–50)
ERYTHROCYTE [SEDIMENTATION RATE] IN BLOOD BY WESTERGREN METHOD: 10 MM/HOUR (ref 0–25)
EST. AVERAGE GLUCOSE BLD GHB EST-MCNC: 85 MG/DL
ESTRADIOL SERPL-MCNC: 100 PG/ML
FASTING STATUS PATIENT QL REPORTED: NORMAL
FERRITIN SERPL-MCNC: 148 NG/ML (ref 10–291)
FSH SERPL-ACNC: 4.3 MIU/ML
GFR SERPLBLD CREATININE-BSD FMLA CKD-EPI: 110 ML/MIN/1.73 M 2
GLOBULIN SER CALC-MCNC: 2.3 G/DL (ref 1.9–3.5)
GLUCOSE SERPL-MCNC: 75 MG/DL (ref 65–99)
HBA1C MFR BLD: 4.6 % (ref 4–5.6)
HCT VFR BLD AUTO: 36 % (ref 37–47)
HDLC SERPL-MCNC: 72 MG/DL
HGB BLD-MCNC: 12.2 G/DL (ref 12–16)
IRON SATN MFR SERPL: 24 % (ref 15–55)
IRON SERPL-MCNC: 56 UG/DL (ref 40–170)
LDLC SERPL CALC-MCNC: 66 MG/DL
LH SERPL-ACNC: 10.6 IU/L
MCH RBC QN AUTO: 31.9 PG (ref 27–33)
MCHC RBC AUTO-ENTMCNC: 33.9 G/DL (ref 32.2–35.5)
MCV RBC AUTO: 94.2 FL (ref 81.4–97.8)
PLATELET # BLD AUTO: 221 K/UL (ref 164–446)
PMV BLD AUTO: 9.9 FL (ref 9–12.9)
POTASSIUM SERPL-SCNC: 4.1 MMOL/L (ref 3.6–5.5)
PROGEST SERPL-MCNC: 0.2 NG/ML
PROT SERPL-MCNC: 6.4 G/DL (ref 6–8.2)
RBC # BLD AUTO: 3.82 M/UL (ref 4.2–5.4)
SODIUM SERPL-SCNC: 139 MMOL/L (ref 135–145)
T4 FREE SERPL-MCNC: 1.6 NG/DL (ref 0.93–1.7)
TIBC SERPL-MCNC: 234 UG/DL (ref 250–450)
TRIGL SERPL-MCNC: 34 MG/DL (ref 0–149)
TSH SERPL DL<=0.005 MIU/L-ACNC: 1.27 UIU/ML (ref 0.38–5.33)
UIBC SERPL-MCNC: 178 UG/DL (ref 110–370)
VIT B12 SERPL-MCNC: 1160 PG/ML (ref 211–911)
WBC # BLD AUTO: 3.9 K/UL (ref 4.8–10.8)

## 2024-05-24 LAB — INSULIN P FAST SERPL-ACNC: 5 UIU/ML (ref 3–25)

## 2024-05-26 LAB
SHBG SERPL-SCNC: 74 NMOL/L (ref 25–122)
TESTOST FREE SERPL-MCNC: 1.9 PG/ML (ref 1.1–5.8)
TESTOST SERPL-MCNC: 19 NG/DL (ref 9–55)

## 2024-05-29 ENCOUNTER — HOSPITAL ENCOUNTER (OUTPATIENT)
Dept: RADIOLOGY | Facility: MEDICAL CENTER | Age: 44
End: 2024-05-29
Attending: OBSTETRICS & GYNECOLOGY
Payer: COMMERCIAL

## 2024-05-29 DIAGNOSIS — Z12.31 SCREENING MAMMOGRAM FOR BREAST CANCER: ICD-10-CM

## 2024-06-06 ENCOUNTER — HOSPITAL ENCOUNTER (OUTPATIENT)
Dept: RADIOLOGY | Facility: MEDICAL CENTER | Age: 44
End: 2024-06-06
Attending: NURSE PRACTITIONER
Payer: COMMERCIAL

## 2024-06-06 DIAGNOSIS — R92.8 ABNORMAL MAMMOGRAM: ICD-10-CM

## 2024-06-06 PROCEDURE — G0279 TOMOSYNTHESIS, MAMMO: HCPCS

## 2024-06-06 PROCEDURE — 76642 ULTRASOUND BREAST LIMITED: CPT | Mod: RT

## 2024-06-20 ENCOUNTER — HOSPITAL ENCOUNTER (OUTPATIENT)
Dept: RADIOLOGY | Facility: MEDICAL CENTER | Age: 44
End: 2024-06-20
Attending: OBSTETRICS & GYNECOLOGY
Payer: COMMERCIAL

## 2024-06-20 DIAGNOSIS — E28.2 POLYCYSTIC OVARIES: ICD-10-CM

## 2024-06-20 PROCEDURE — 76830 TRANSVAGINAL US NON-OB: CPT

## 2025-07-02 ENCOUNTER — HOSPITAL ENCOUNTER (OUTPATIENT)
Dept: LAB | Facility: MEDICAL CENTER | Age: 45
End: 2025-07-02
Attending: NURSE PRACTITIONER
Payer: COMMERCIAL

## 2025-07-02 LAB
ALBUMIN SERPL BCP-MCNC: 3.8 G/DL (ref 3.2–4.9)
ALBUMIN/GLOB SERPL: 1.7 G/DL
ALP SERPL-CCNC: 48 U/L (ref 30–99)
ALT SERPL-CCNC: 17 U/L (ref 2–50)
ANION GAP SERPL CALC-SCNC: 9 MMOL/L (ref 7–16)
AST SERPL-CCNC: 24 U/L (ref 12–45)
BASOPHILS # BLD AUTO: 0.8 % (ref 0–1.8)
BASOPHILS # BLD: 0.03 K/UL (ref 0–0.12)
BILIRUB SERPL-MCNC: 0.5 MG/DL (ref 0.1–1.5)
BUN SERPL-MCNC: 7 MG/DL (ref 8–22)
CALCIUM ALBUM COR SERPL-MCNC: 9.3 MG/DL (ref 8.5–10.5)
CALCIUM SERPL-MCNC: 9.1 MG/DL (ref 8.5–10.5)
CHLORIDE SERPL-SCNC: 106 MMOL/L (ref 96–112)
CHOLEST SERPL-MCNC: 153 MG/DL (ref 100–199)
CO2 SERPL-SCNC: 24 MMOL/L (ref 20–33)
CREAT SERPL-MCNC: 0.77 MG/DL (ref 0.5–1.4)
CRP SERPL HS-MCNC: <0.3 MG/DL (ref 0–0.75)
EOSINOPHIL # BLD AUTO: 0.07 K/UL (ref 0–0.51)
EOSINOPHIL NFR BLD: 1.8 % (ref 0–6.9)
ERYTHROCYTE [DISTWIDTH] IN BLOOD BY AUTOMATED COUNT: 41.1 FL (ref 35.9–50)
EST. AVERAGE GLUCOSE BLD GHB EST-MCNC: 94 MG/DL
ESTRADIOL SERPL-MCNC: 348 PG/ML
FSH SERPL-ACNC: 1.5 MIU/ML
GFR SERPLBLD CREATININE-BSD FMLA CKD-EPI: 97 ML/MIN/1.73 M 2
GLOBULIN SER CALC-MCNC: 2.2 G/DL (ref 1.9–3.5)
GLUCOSE SERPL-MCNC: 76 MG/DL (ref 65–99)
HBA1C MFR BLD: 4.9 % (ref 4–5.6)
HCT VFR BLD AUTO: 34.6 % (ref 37–47)
HDLC SERPL-MCNC: 73 MG/DL
HGB BLD-MCNC: 12.2 G/DL (ref 12–16)
IMM GRANULOCYTES # BLD AUTO: 0.01 K/UL (ref 0–0.11)
IMM GRANULOCYTES NFR BLD AUTO: 0.3 % (ref 0–0.9)
LDLC SERPL CALC-MCNC: 69 MG/DL
LH SERPL-ACNC: 4 IU/L
LYMPHOCYTES # BLD AUTO: 2.01 K/UL (ref 1–4.8)
LYMPHOCYTES NFR BLD: 50.5 % (ref 22–41)
MCH RBC QN AUTO: 32.7 PG (ref 27–33)
MCHC RBC AUTO-ENTMCNC: 35.3 G/DL (ref 32.2–35.5)
MCV RBC AUTO: 92.8 FL (ref 81.4–97.8)
MONOCYTES # BLD AUTO: 0.37 K/UL (ref 0–0.85)
MONOCYTES NFR BLD AUTO: 9.3 % (ref 0–13.4)
NEUTROPHILS # BLD AUTO: 1.49 K/UL (ref 1.82–7.42)
NEUTROPHILS NFR BLD: 37.3 % (ref 44–72)
NRBC # BLD AUTO: 0 K/UL
NRBC BLD-RTO: 0 /100 WBC (ref 0–0.2)
PLATELET # BLD AUTO: 186 K/UL (ref 164–446)
PMV BLD AUTO: 10 FL (ref 9–12.9)
POTASSIUM SERPL-SCNC: 3.7 MMOL/L (ref 3.6–5.5)
PROGEST SERPL-MCNC: 0.19 NG/ML
PROT SERPL-MCNC: 6 G/DL (ref 6–8.2)
RBC # BLD AUTO: 3.73 M/UL (ref 4.2–5.4)
SODIUM SERPL-SCNC: 139 MMOL/L (ref 135–145)
T4 FREE SERPL-MCNC: 1.38 NG/DL (ref 0.93–1.7)
TRIGL SERPL-MCNC: 56 MG/DL (ref 0–149)
TSH SERPL-ACNC: 2.1 UIU/ML (ref 0.38–5.33)
WBC # BLD AUTO: 4 K/UL (ref 4.8–10.8)

## 2025-07-02 PROCEDURE — 84443 ASSAY THYROID STIM HORMONE: CPT

## 2025-07-02 PROCEDURE — 36415 COLL VENOUS BLD VENIPUNCTURE: CPT

## 2025-07-02 PROCEDURE — 84144 ASSAY OF PROGESTERONE: CPT

## 2025-07-02 PROCEDURE — 83002 ASSAY OF GONADOTROPIN (LH): CPT

## 2025-07-02 PROCEDURE — 85025 COMPLETE CBC W/AUTO DIFF WBC: CPT

## 2025-07-02 PROCEDURE — 86038 ANTINUCLEAR ANTIBODIES: CPT

## 2025-07-02 PROCEDURE — 86140 C-REACTIVE PROTEIN: CPT

## 2025-07-02 PROCEDURE — 83036 HEMOGLOBIN GLYCOSYLATED A1C: CPT

## 2025-07-02 PROCEDURE — 80053 COMPREHEN METABOLIC PANEL: CPT

## 2025-07-02 PROCEDURE — 83001 ASSAY OF GONADOTROPIN (FSH): CPT

## 2025-07-02 PROCEDURE — 84439 ASSAY OF FREE THYROXINE: CPT

## 2025-07-02 PROCEDURE — 82670 ASSAY OF TOTAL ESTRADIOL: CPT

## 2025-07-02 PROCEDURE — 80061 LIPID PANEL: CPT

## 2025-07-03 LAB — NUCLEAR IGG SER QL IA: NORMAL

## 2025-07-17 ENCOUNTER — HOSPITAL ENCOUNTER (OUTPATIENT)
Dept: LAB | Facility: MEDICAL CENTER | Age: 45
End: 2025-07-17
Attending: NURSE PRACTITIONER
Payer: COMMERCIAL

## 2025-07-17 LAB
BASOPHILS # BLD AUTO: 0.8 % (ref 0–1.8)
BASOPHILS # BLD: 0.03 K/UL (ref 0–0.12)
EOSINOPHIL # BLD AUTO: 0.06 K/UL (ref 0–0.51)
EOSINOPHIL NFR BLD: 1.7 % (ref 0–6.9)
ERYTHROCYTE [DISTWIDTH] IN BLOOD BY AUTOMATED COUNT: 40.8 FL (ref 35.9–50)
HCT VFR BLD AUTO: 36.5 % (ref 37–47)
HGB BLD-MCNC: 12.7 G/DL (ref 12–16)
IMM GRANULOCYTES # BLD AUTO: 0.01 K/UL (ref 0–0.11)
IMM GRANULOCYTES NFR BLD AUTO: 0.3 % (ref 0–0.9)
LYMPHOCYTES # BLD AUTO: 1.98 K/UL (ref 1–4.8)
LYMPHOCYTES NFR BLD: 55.3 % (ref 22–41)
MCH RBC QN AUTO: 32.2 PG (ref 27–33)
MCHC RBC AUTO-ENTMCNC: 34.8 G/DL (ref 32.2–35.5)
MCV RBC AUTO: 92.4 FL (ref 81.4–97.8)
MONOCYTES # BLD AUTO: 0.33 K/UL (ref 0–0.85)
MONOCYTES NFR BLD AUTO: 9.2 % (ref 0–13.4)
NEUTROPHILS # BLD AUTO: 1.17 K/UL (ref 1.82–7.42)
NEUTROPHILS NFR BLD: 32.7 % (ref 44–72)
NRBC # BLD AUTO: 0 K/UL
NRBC BLD-RTO: 0 /100 WBC (ref 0–0.2)
PLATELET # BLD AUTO: 241 K/UL (ref 164–446)
PMV BLD AUTO: 9.7 FL (ref 9–12.9)
RBC # BLD AUTO: 3.95 M/UL (ref 4.2–5.4)
WBC # BLD AUTO: 3.6 K/UL (ref 4.8–10.8)

## 2025-07-17 PROCEDURE — 85025 COMPLETE CBC W/AUTO DIFF WBC: CPT

## 2025-07-17 PROCEDURE — 36415 COLL VENOUS BLD VENIPUNCTURE: CPT
